# Patient Record
Sex: MALE
[De-identification: names, ages, dates, MRNs, and addresses within clinical notes are randomized per-mention and may not be internally consistent; named-entity substitution may affect disease eponyms.]

---

## 2022-07-07 ENCOUNTER — NURSE TRIAGE (OUTPATIENT)
Dept: OTHER | Facility: CLINIC | Age: 42
End: 2022-07-07

## 2022-07-08 NOTE — TELEPHONE ENCOUNTER
Subjective: Caller states \"I have a headache, congestion, body aches, fatigue, cough when laying down and off and on fever. \"     Onset: Tuesday, 7/5 in the middle of the day    Pain Severity: 6/10; back, side and a little in the chest - feels like it is in the bones and is a constant pain    Temperature: haven't taken temp, but feels hot. Took temp one time on 7/6 in pm and it was about 100.0    What has been tried: ibuprofen, doesn't like taking much OTC meds    Recommended disposition: Pt saw a Provider today. RN advised pt to follow up with Provider again to inform the Provider of these symptoms  Recommended that pt be in an isolated situation tonight and until pt can receive follow-up care from Provider and recent hospitalization. Care advice provided, patient verbalizes understanding; denies any other questions or concerns; instructed to call back for any new or worsening symptoms. Patient/caller agrees to follow-up with PCP     Attention Provider: Thank you for allowing me to participate in the care of your patient. The patient was connected to triage in response to symptoms provided. Please do not respond through this encounter as the response is not directed to a shared pool.       Reason for Disposition   [1] Headache AND [2] part of viral illness AND [3] present < 72 hours    Protocols used: HEADACHE-ADULT-AH